# Patient Record
Sex: FEMALE | Race: WHITE
[De-identification: names, ages, dates, MRNs, and addresses within clinical notes are randomized per-mention and may not be internally consistent; named-entity substitution may affect disease eponyms.]

---

## 2020-01-20 NOTE — NUR
PT AMBULATES WELL, NAD NOTED AT THIS TIME. TOLD SHE NEEDS TO BE ON HTN MEDS 
LAST WEEK, HAS NOT PICKED UP MEDS. LAST ETOH X1 WEEK AGO, DECREASED IN 
CIGARETTES. SIDE RAIL UP, CALL LIGHT IN REACH. AWAITING ORDERS.

## 2020-01-20 NOTE — NUR
Discharge instructions given. All questions and concerns addressed. Patient 
ambulatory with a steady gait. Belongings with patient.

## 2020-04-06 ENCOUNTER — HOSPITAL ENCOUNTER (EMERGENCY)
Dept: HOSPITAL 8 - ED | Age: 40
Discharge: HOME | End: 2020-04-06
Payer: SELF-PAY

## 2020-04-06 VITALS — HEIGHT: 67 IN | WEIGHT: 180.12 LBS | BODY MASS INDEX: 28.27 KG/M2

## 2020-04-06 VITALS — DIASTOLIC BLOOD PRESSURE: 106 MMHG | SYSTOLIC BLOOD PRESSURE: 171 MMHG

## 2020-04-06 DIAGNOSIS — I10: Primary | ICD-10-CM

## 2020-04-06 DIAGNOSIS — Z76.0: ICD-10-CM

## 2020-04-06 PROCEDURE — 99281 EMR DPT VST MAYX REQ PHY/QHP: CPT

## 2020-10-01 ENCOUNTER — HOSPITAL ENCOUNTER (EMERGENCY)
Dept: HOSPITAL 8 - ED | Age: 40
End: 2020-10-01
Payer: COMMERCIAL

## 2020-10-01 VITALS — BODY MASS INDEX: 27.96 KG/M2 | HEIGHT: 67 IN | WEIGHT: 178.13 LBS

## 2020-10-01 VITALS — SYSTOLIC BLOOD PRESSURE: 168 MMHG | DIASTOLIC BLOOD PRESSURE: 103 MMHG

## 2020-10-01 DIAGNOSIS — Z53.21: ICD-10-CM

## 2020-10-01 DIAGNOSIS — F41.9: Primary | ICD-10-CM

## 2021-02-09 ENCOUNTER — HOSPITAL ENCOUNTER (EMERGENCY)
Dept: HOSPITAL 8 - ED | Age: 41
Discharge: HOME | End: 2021-02-09
Payer: MEDICAID

## 2021-02-09 VITALS — BODY MASS INDEX: 29.2 KG/M2 | HEIGHT: 67 IN | WEIGHT: 186.07 LBS

## 2021-02-09 VITALS — SYSTOLIC BLOOD PRESSURE: 152 MMHG | DIASTOLIC BLOOD PRESSURE: 72 MMHG

## 2021-02-09 DIAGNOSIS — K02.9: Primary | ICD-10-CM

## 2021-02-09 DIAGNOSIS — F17.210: ICD-10-CM

## 2021-02-09 PROCEDURE — 99283 EMERGENCY DEPT VISIT LOW MDM: CPT

## 2021-02-09 PROCEDURE — 99406 BEHAV CHNG SMOKING 3-10 MIN: CPT

## 2021-04-03 ENCOUNTER — HOSPITAL ENCOUNTER (EMERGENCY)
Dept: HOSPITAL 8 - ED | Age: 41
Discharge: HOME | End: 2021-04-03
Payer: MEDICAID

## 2021-04-03 VITALS — WEIGHT: 184.53 LBS | BODY MASS INDEX: 28.96 KG/M2 | HEIGHT: 67 IN

## 2021-04-03 VITALS — DIASTOLIC BLOOD PRESSURE: 89 MMHG | SYSTOLIC BLOOD PRESSURE: 171 MMHG

## 2021-04-03 DIAGNOSIS — F17.210: ICD-10-CM
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
## 2021-04-03 DIAGNOSIS — S00.83XA: Primary | ICD-10-CM

## 2021-04-03 DIAGNOSIS — Z90.710: ICD-10-CM

## 2021-04-03 DIAGNOSIS — I10: ICD-10-CM
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
## 2021-04-03 PROCEDURE — 99282 EMERGENCY DEPT VISIT SF MDM: CPT

## 2021-04-03 PROCEDURE — 99406 BEHAV CHNG SMOKING 3-10 MIN: CPT
